# Patient Record
Sex: FEMALE | Race: WHITE | NOT HISPANIC OR LATINO | URBAN - METROPOLITAN AREA
[De-identification: names, ages, dates, MRNs, and addresses within clinical notes are randomized per-mention and may not be internally consistent; named-entity substitution may affect disease eponyms.]

---

## 2020-09-01 ENCOUNTER — APPOINTMENT (RX ONLY)
Dept: URBAN - METROPOLITAN AREA OTHER 10 | Facility: OTHER | Age: 65
Setting detail: DERMATOLOGY
End: 2020-09-01

## 2020-09-01 DIAGNOSIS — L65.0 TELOGEN EFFLUVIUM: ICD-10-CM

## 2020-09-01 PROCEDURE — ? PATIENT SPECIFIC COUNSELING

## 2020-09-01 PROCEDURE — 99213 OFFICE O/P EST LOW 20 MIN: CPT

## 2020-09-01 PROCEDURE — ? TREATMENT REGIMEN

## 2020-09-01 PROCEDURE — ? COUNSELING

## 2020-09-01 ASSESSMENT — LOCATION DETAILED DESCRIPTION DERM
LOCATION DETAILED: MID-FRONTAL SCALP
LOCATION DETAILED: POSTERIOR MID-PARIETAL SCALP

## 2020-09-01 ASSESSMENT — LOCATION ZONE DERM: LOCATION ZONE: SCALP

## 2020-09-01 ASSESSMENT — LOCATION SIMPLE DESCRIPTION DERM
LOCATION SIMPLE: ANTERIOR SCALP
LOCATION SIMPLE: POSTERIOR SCALP

## 2020-09-01 NOTE — PROCEDURE: PATIENT SPECIFIC COUNSELING
Pt has h/o hypersensitivity reactions to new medications. Pt tried and started 3 different sleep apnea medications in the last couple of months, two she had urticarial reactions. Pt currently on a third one and had to start Gabapentin prescribed by pcp due to mild urticaria. Advised Pt that the telegenic could have been a result of the drug eruption to the previous medications. Since hair has decreased shedding advised to use Rogaine for 3 months and RTC.\\n\\nAdvised pt to consult pcp if itchiness continues or worsens despite the gabapentin.\\n\\nRecommended topical regimen Rogaine for maintenance.
Detail Level: Simple